# Patient Record
Sex: MALE | Race: WHITE | URBAN - METROPOLITAN AREA
[De-identification: names, ages, dates, MRNs, and addresses within clinical notes are randomized per-mention and may not be internally consistent; named-entity substitution may affect disease eponyms.]

---

## 2020-11-11 ENCOUNTER — OFFICE VISIT (OUTPATIENT)
Dept: FAMILY MEDICINE CLINIC | Facility: CLINIC | Age: 30
End: 2020-11-11

## 2020-11-11 VITALS
SYSTOLIC BLOOD PRESSURE: 108 MMHG | RESPIRATION RATE: 16 BRPM | BODY MASS INDEX: 30.04 KG/M2 | OXYGEN SATURATION: 98 % | HEIGHT: 68 IN | WEIGHT: 198.19 LBS | DIASTOLIC BLOOD PRESSURE: 78 MMHG | TEMPERATURE: 98 F | HEART RATE: 78 BPM

## 2020-11-11 DIAGNOSIS — M54.50 CHRONIC RIGHT-SIDED LOW BACK PAIN WITHOUT SCIATICA: Primary | ICD-10-CM

## 2020-11-11 DIAGNOSIS — B07.0 PLANTAR WART OF RIGHT FOOT: ICD-10-CM

## 2020-11-11 DIAGNOSIS — G89.29 CHRONIC RIGHT-SIDED LOW BACK PAIN WITHOUT SCIATICA: Primary | ICD-10-CM

## 2020-11-11 PROCEDURE — 3074F SYST BP LT 130 MM HG: CPT | Performed by: NURSE PRACTITIONER

## 2020-11-11 PROCEDURE — 3008F BODY MASS INDEX DOCD: CPT | Performed by: NURSE PRACTITIONER

## 2020-11-11 PROCEDURE — 99204 OFFICE O/P NEW MOD 45 MIN: CPT | Performed by: NURSE PRACTITIONER

## 2020-11-11 PROCEDURE — 3078F DIAST BP <80 MM HG: CPT | Performed by: NURSE PRACTITIONER

## 2020-11-11 NOTE — PROGRESS NOTES
[address]  PROGRESS NOTE  Chief Complaint:   Patient presents with:  Referral: Referral to derm for plantar warts and referral to chiropractor for tightness/stiffness in back       HPI:   This is a 34year old male coming in for dermato fatigue. EENT:  Eyes:  Denies eye pain, visual loss, blurred vision, double vision or yellow sclerae. INTEGUMENTARY:  Denies rashes, itching,  or excessive skin dryness. Right foot plantar wart.   CARDIOVASCULAR:  Denies chest pain, chest pressure, chest spasm. Full ROM. EXTREMITIES:  No edema, no cyanosis, no clubbing, Full ROM, 2+ dorsalis pedis pulses bilaterally. Straight leg test negative bilaterally. NEURO:  No deficit, normal gait, strength and tone, sensory intact, normal reflexes.     ASSESSMENT removal procedure with Jf Roper here in office. Back:  Push more fluids, especially after workouts. Aleve 2 tablets at night, 1 in the morning; take consistently for a week; take with food.    Warm epsom salt bath every day after work, have your wife nir

## 2020-11-11 NOTE — PATIENT INSTRUCTIONS
Right plantar wart:  Duct tape to site for 7 days prior to follow up with Goldie Erickson; wear it at all times during the day and in the shower, replace if starting to fall off. Schedule plantar wart removal procedure with Goldie Cheese here in office.      Back:  Push m

## 2020-11-18 ENCOUNTER — OFFICE VISIT (OUTPATIENT)
Dept: FAMILY MEDICINE CLINIC | Facility: CLINIC | Age: 30
End: 2020-11-18

## 2020-11-18 VITALS
WEIGHT: 191 LBS | DIASTOLIC BLOOD PRESSURE: 80 MMHG | HEART RATE: 80 BPM | HEIGHT: 68 IN | SYSTOLIC BLOOD PRESSURE: 110 MMHG | RESPIRATION RATE: 16 BRPM | OXYGEN SATURATION: 97 % | TEMPERATURE: 98 F | BODY MASS INDEX: 28.95 KG/M2

## 2020-11-18 DIAGNOSIS — B07.0 PLANTAR WART OF RIGHT FOOT: Primary | ICD-10-CM

## 2020-11-18 PROCEDURE — 3008F BODY MASS INDEX DOCD: CPT | Performed by: NURSE PRACTITIONER

## 2020-11-18 PROCEDURE — 3079F DIAST BP 80-89 MM HG: CPT | Performed by: NURSE PRACTITIONER

## 2020-11-18 PROCEDURE — 99213 OFFICE O/P EST LOW 20 MIN: CPT | Performed by: NURSE PRACTITIONER

## 2020-11-18 PROCEDURE — 3074F SYST BP LT 130 MM HG: CPT | Performed by: NURSE PRACTITIONER

## 2020-11-18 NOTE — PROGRESS NOTES
Shaquille Eaton is a 34year old male. Patient presents with:  Warts: right foot.   Follow - Up: patient saw Mary Hawthorne on 11/11/2020      HPI:   Complaints of plantar wart to right foot - painful - has put duct tape on his foot     Patient Active Problem List

## 2020-11-25 ENCOUNTER — OFFICE VISIT (OUTPATIENT)
Dept: FAMILY MEDICINE CLINIC | Facility: CLINIC | Age: 30
End: 2020-11-25

## 2020-11-25 VITALS
OXYGEN SATURATION: 99 % | HEIGHT: 68 IN | RESPIRATION RATE: 16 BRPM | DIASTOLIC BLOOD PRESSURE: 80 MMHG | TEMPERATURE: 97 F | BODY MASS INDEX: 30.31 KG/M2 | WEIGHT: 200 LBS | SYSTOLIC BLOOD PRESSURE: 126 MMHG | HEART RATE: 77 BPM

## 2020-11-25 DIAGNOSIS — B07.0 PLANTAR WART OF RIGHT FOOT: Primary | ICD-10-CM

## 2020-11-25 PROCEDURE — 3008F BODY MASS INDEX DOCD: CPT | Performed by: NURSE PRACTITIONER

## 2020-11-25 PROCEDURE — 99213 OFFICE O/P EST LOW 20 MIN: CPT | Performed by: NURSE PRACTITIONER

## 2020-11-25 PROCEDURE — 3074F SYST BP LT 130 MM HG: CPT | Performed by: NURSE PRACTITIONER

## 2020-11-25 PROCEDURE — 3079F DIAST BP 80-89 MM HG: CPT | Performed by: NURSE PRACTITIONER

## 2020-11-27 NOTE — PROGRESS NOTES
Efe Dawn is a 34year old male.   Patient presents with:  Rash Skin Problem      HPI:   Complaints of plantar wart to right foot - painful patient returns for repeat cryo–states he can no longer see the \"root\"    Patient Active Problem List:     Ope

## 2021-04-09 DIAGNOSIS — Z23 NEED FOR VACCINATION: ICD-10-CM

## 2022-01-20 ENCOUNTER — OFFICE VISIT (OUTPATIENT)
Dept: FAMILY MEDICINE CLINIC | Facility: CLINIC | Age: 32
End: 2022-01-20
Payer: COMMERCIAL

## 2022-01-20 VITALS
HEIGHT: 68 IN | TEMPERATURE: 98 F | WEIGHT: 198 LBS | SYSTOLIC BLOOD PRESSURE: 110 MMHG | HEART RATE: 82 BPM | DIASTOLIC BLOOD PRESSURE: 66 MMHG | RESPIRATION RATE: 16 BRPM | BODY MASS INDEX: 30.01 KG/M2 | OXYGEN SATURATION: 96 %

## 2022-01-20 DIAGNOSIS — B07.0 PLANTAR WART, LEFT FOOT: ICD-10-CM

## 2022-01-20 DIAGNOSIS — L72.3 SEBACEOUS CYST: Primary | ICD-10-CM

## 2022-01-20 DIAGNOSIS — B07.0 PLANTAR WART OF RIGHT FOOT: ICD-10-CM

## 2022-01-20 PROCEDURE — 99213 OFFICE O/P EST LOW 20 MIN: CPT | Performed by: NURSE PRACTITIONER

## 2022-01-20 PROCEDURE — 3074F SYST BP LT 130 MM HG: CPT | Performed by: NURSE PRACTITIONER

## 2022-01-20 PROCEDURE — 3008F BODY MASS INDEX DOCD: CPT | Performed by: NURSE PRACTITIONER

## 2022-01-20 PROCEDURE — 3078F DIAST BP <80 MM HG: CPT | Performed by: NURSE PRACTITIONER

## 2022-01-20 NOTE — PATIENT INSTRUCTIONS
Follow up in 1- 2 weeks if warts are still present -  -    Duct tape     Follow up with dermatology     Consider using Hibiclens soap

## 2022-01-20 NOTE — PROGRESS NOTES
Karthik Vazquez is a 32year old male. Patient presents with:  Warts: right foot heel, started 2-3 weeks ago  Lump: behind right earlobe      HPI:   Complaints of plantar war - right heel -and also to left side of the foot.   States he has had plantars wart erythema or discharge  LUNGS: normal rate without respiratory distress, lungs clear to auscultation  CARDIO: RRR without murmur, no edema    ASSESSMENT AND PLAN:     Sebaceous cyst  (primary encounter diagnosis)  Plantar wart of right foot  Plantar wart, l